# Patient Record
Sex: FEMALE | URBAN - METROPOLITAN AREA
[De-identification: names, ages, dates, MRNs, and addresses within clinical notes are randomized per-mention and may not be internally consistent; named-entity substitution may affect disease eponyms.]

---

## 2023-08-16 ENCOUNTER — TRANSCRIBE ORDERS (OUTPATIENT)
Dept: PHYSICAL THERAPY | Facility: CLINIC | Age: 83
End: 2023-08-16

## 2023-08-16 DIAGNOSIS — R42 DIZZINESS AND GIDDINESS: Primary | ICD-10-CM

## 2023-09-12 ENCOUNTER — TREATMENT (OUTPATIENT)
Dept: PHYSICAL THERAPY | Facility: CLINIC | Age: 83
End: 2023-09-12
Payer: MEDICARE

## 2023-09-12 DIAGNOSIS — R26.89 IMPAIRMENT OF BALANCE: Primary | ICD-10-CM

## 2023-09-12 NOTE — PROGRESS NOTES
Physical Therapy Initial Evaluation and Plan of Care     [facility] [place]          [address] [place]     Patient: Lucinda Taveras   : 1940  Diagnosis/ICD-10 Code:  Impairment of balance [R26.89]  Referring practitioner: Davina Dennis MD  Date of Initial Visit: 2023  Today's Date: 2023  Patient seen for 1 sessions    Subjective:     Subjective Questionnaire: BAUTISTA 38/56  TUG 16.67 sec  10 Meter 11.61 sec      Subjective Evaluation    History of Present Illness  Mechanism of injury: Patients balance has been off for a little bit. Since covid they have seen a decline in mobility. She has had her 2 nd shot of cortison for her knees. She had a fall in [employer] parking lot a few months ago. Strength is declined. The fall was sidways to the L. When she showers she has to be holding and touching something. Driving herself. Lives alone, no stairs. Daughter lives across the [facility]. Lawn care is hired. Getting up from chairs are hard. Bending over and squatting is challenging.       Patient Occupation: retired Pain  Current pain ratin  At best pain ratin  At worst pain ratin  Location: B knees    Social Support  Lives in: one-story house  Lives with: alone           Objective          Strength/Myotome Testing     Left Hip   Planes of Motion   Flexion: 4  Extension: 2  Abduction: 4    Right Hip   Planes of Motion   Flexion: 4  Extension: 2  Abduction: 4    Left Knee   Flexion: 4+  Extension: 4+    Right Knee   Flexion: 4+  Extension: 4+    Ambulation     Comments   Normalized gait with slow speed      PT Neuro         Assessment & Plan       Assessment  Impairments: abnormal coordination, abnormal gait, activity intolerance, impaired balance, impaired physical strength and safety issue   Functional limitations: carrying objects, walking, standing and stooping   Assessment details: Patient is an 82 YOF that presents with ongoing balance and strength deficits. MMT  reveals decreased strength throughout B hip musculature. Patient also is testing low on BAUTISTA balance test with a 38/56, which is a moderate risk for falls. She will require skilled PT intervention to address deficits in order to improve global mobility and function.   Prognosis: fair    Goals  Plan Goals: STG (4 visits)  1. Patient will report compliance with initial HEP.   2. Patient to improve BAUTISTA balance score to >/= 42/56 to decrease patient's risk of falls.  3. Patient to perform TUG within 14 sec without LOB for improved functional mobility.  4. Patient to ambulate 10 meters without AD within 10 sec without LOB for improved gait caitlyn and functional mobility.      LTG (8 visits)  1. Patient will be I with final HEP.   2. Patient to improve BAUTISTA balance score to >/= 46/56 to decrease patient's risk of falls.  3. Patient to perform TUG within 12 sec without LOB for improved functional mobility.  4. Patient to ambulate 10 meters without AD within 9 sec without LOB for improved gait caitlyn and functional mobility.        Plan  Therapy options: will be seen for skilled therapy services  Planned modality interventions: TENS  Planned therapy interventions: ADL retraining, balance/weight-bearing training, flexibility, gait training, manual therapy, neuromuscular re-education, motor coordination training, postural training, strengthening, stretching, therapeutic activities, transfer training and home exercise program  Duration in visits: 8  Treatment plan discussed with: patient and family  Plan details: Patient will be seen 1x/wk x 8wks with treatment to include strengthening, stretching, manual therapy, neuromuscular re-education, balance, gait and endurance training.         Timed:  Manual Therapy:    0     mins  66888;  Therapeutic Exercise:    10     mins  27548;     Neuromuscular Yesi:    0    mins  85341;    Therapeutic Activity:     0     mins  56951;     Gait Trainin     mins  16487;      Electrical Stimulation:    0     mins  13592 ( );    Untimed:  Canalith Repositioning    0     mins 77626    Timed Treatment:   10   mins   Total Treatment:     45   mins    PT SIGNATURE: Jacinda Ward, PT, DPT, MSCS, CDP, CSRS  [place] License #[id number]  DATE TREATMENT INITIATED: 9/12/2023      Medicare Initial Certification Certification Period: 9/12/20230024zmka50/10/2023  I certify that the therapy services are furnished while this patient is under my care.  The services outlined above are required by this patient, and will be reviewed every 90 days.     PHYSICIAN: Davina Dennis MD  NPI: [id number]                                         DATE:     Please sign and return via fax to [phone].   Thank you,   [facility].

## 2023-09-26 ENCOUNTER — TREATMENT (OUTPATIENT)
Dept: PHYSICAL THERAPY | Facility: CLINIC | Age: 83
End: 2023-09-26
Payer: MEDICARE

## 2023-09-26 DIAGNOSIS — R26.89 IMPAIRMENT OF BALANCE: Primary | ICD-10-CM

## 2023-09-26 NOTE — PROGRESS NOTES
Physical Therapy Daily Note  Visit: 2  Date of Initial Visit: Type: THERAPY  Noted: 2023    Patient: Lucinda Taveras   : 1940  Diagnosis/ICD-10 Code:  Impairment of balance [R26.89]  Referring practitioner: Davina Dennis MD  Date of Initial Visit: Type: THERAPY  Noted: 2023  Today's Date: 2023  Patient seen for 2 sessions    Subjective:   Patient reports: she is feeling pretty good.   Pain: 0/10  Clinical Progress: improved  Home Program Compliance: Yes  Treatment has included: therapeutic exercise and neuromuscular re-education    Objective   See Exercise, Manual, and Modality Logs for complete treatment.    PT Neuro          Assessment & Plan       Assessment  Assessment details: Patient is performing all exercises well. Her balance is improving by shifting weight forward on her toes. Her strength is functional increasing with improved sit to stands without the use of UE's. Patient will continue to be progressed as indicated for balance and strength.     Plan  Plan details: Patient to continue with PT services to improve gait, balance, strength, transfers and overall functional mobility.        Timed:  Manual Therapy:            0     mins  46448;  Therapeutic Exercise:    25    mins  29531;     Neuromuscular Yesi:    15    mins  36484;    Therapeutic Activity:      0     mins  34846;     Gait Trainin    mins  35838;     Electrical Stimulation:    0    mins  69833 ( );     Untimed:  Canalith Repositioning techniques _0_ [id number]      Timed Treatment:   40   mins   Total Treatment:     40   mins      Jacinda Ward, PT, DPT, MSCS, CDP, CSRS  [place] License #: [id number]  Physical Therapist

## 2023-11-04 ENCOUNTER — HOSPITAL ENCOUNTER (EMERGENCY)
Facility: HOSPITAL | Age: 83
Discharge: HOME | End: 2023-11-04
Attending: EMERGENCY MEDICINE
Payer: MEDICARE

## 2023-11-04 ENCOUNTER — APPOINTMENT (OUTPATIENT)
Dept: CARDIOLOGY | Facility: HOSPITAL | Age: 83
End: 2023-11-04
Payer: MEDICARE

## 2023-11-04 VITALS
BODY MASS INDEX: 29.73 KG/M2 | SYSTOLIC BLOOD PRESSURE: 144 MMHG | OXYGEN SATURATION: 97 % | DIASTOLIC BLOOD PRESSURE: 104 MMHG | TEMPERATURE: 98.2 F | WEIGHT: 185 LBS | RESPIRATION RATE: 18 BRPM | HEIGHT: 66 IN | HEART RATE: 89 BPM

## 2023-11-04 DIAGNOSIS — M54.16 LUMBAR RADICULAR PAIN: Primary | ICD-10-CM

## 2023-11-04 DIAGNOSIS — I70.422 ATHEROSCLEROSIS OF AUTOLOGOUS VEIN BYPASS GRAFT OF LEFT LOWER EXTREMITY WITH REST PAIN: ICD-10-CM

## 2023-11-04 LAB
ALBUMIN SERPL-MCNC: 4.3 G/DL (ref 3.5–5.2)
ALBUMIN/GLOB SERPL: 2 G/DL
ALP SERPL-CCNC: 69 U/L (ref 39–117)
ALT SERPL W P-5'-P-CCNC: 30 U/L (ref 1–33)
ANION GAP SERPL CALCULATED.3IONS-SCNC: 11 MMOL/L (ref 5–15)
AST SERPL-CCNC: 37 U/L (ref 1–32)
BASOPHILS # BLD AUTO: 0.08 10*3/MM3 (ref 0–0.2)
BASOPHILS NFR BLD AUTO: 1.2 % (ref 0–1.5)
BH CV LOWER VASCULAR LEFT COMMON FEMORAL AUGMENT: NORMAL
BH CV LOWER VASCULAR LEFT COMMON FEMORAL COMPRESS: NORMAL
BH CV LOWER VASCULAR LEFT COMMON FEMORAL PHASIC: NORMAL
BH CV LOWER VASCULAR LEFT COMMON FEMORAL SPONT: NORMAL
BH CV LOWER VASCULAR LEFT DISTAL FEMORAL COMPRESS: NORMAL
BH CV LOWER VASCULAR LEFT GASTRONEMIUS COMPRESS: NORMAL
BH CV LOWER VASCULAR LEFT GREATER SAPH AK COMPRESS: NORMAL
BH CV LOWER VASCULAR LEFT GREATER SAPH BK COMPRESS: NORMAL
BH CV LOWER VASCULAR LEFT LESSER SAPH COMPRESS: NORMAL
BH CV LOWER VASCULAR LEFT MID FEMORAL AUGMENT: NORMAL
BH CV LOWER VASCULAR LEFT MID FEMORAL COMPRESS: NORMAL
BH CV LOWER VASCULAR LEFT MID FEMORAL PHASIC: NORMAL
BH CV LOWER VASCULAR LEFT MID FEMORAL SPONT: NORMAL
BH CV LOWER VASCULAR LEFT PERONEAL COMPRESS: NORMAL
BH CV LOWER VASCULAR LEFT POPLITEAL AUGMENT: NORMAL
BH CV LOWER VASCULAR LEFT POPLITEAL COMPRESS: NORMAL
BH CV LOWER VASCULAR LEFT POPLITEAL PHASIC: NORMAL
BH CV LOWER VASCULAR LEFT POPLITEAL SPONT: NORMAL
BH CV LOWER VASCULAR LEFT POSTERIOR TIBIAL COMPRESS: NORMAL
BH CV LOWER VASCULAR LEFT PROFUNDA FEMORAL PHASIC: NORMAL
BH CV LOWER VASCULAR LEFT PROFUNDA FEMORAL SPONT: NORMAL
BH CV LOWER VASCULAR LEFT PROXIMAL FEMORAL COMPRESS: NORMAL
BH CV LOWER VASCULAR LEFT SAPHENOFEMORAL JUNCTION COMPRESS: NORMAL
BH CV LOWER VASCULAR LEFT VARICOSITY AK COMPRESS: NORMAL
BH CV LOWER VASCULAR LEFT VARICOSITY BK COMPRESS: NORMAL
BH CV LOWER VASCULAR RIGHT COMMON FEMORAL AUGMENT: NORMAL
BH CV LOWER VASCULAR RIGHT COMMON FEMORAL COMPRESS: NORMAL
BH CV LOWER VASCULAR RIGHT COMMON FEMORAL PHASIC: NORMAL
BH CV LOWER VASCULAR RIGHT COMMON FEMORAL SPONT: NORMAL
BH CV VAS PRELIMINARY FINDINGS SCRIPTING: 1
BILIRUB SERPL-MCNC: 0.3 MG/DL (ref 0–1.2)
BUN SERPL-MCNC: 17 MG/DL (ref 8–23)
BUN/CREAT SERPL: 17.3 (ref 7–25)
CALCIUM SPEC-SCNC: 9.6 MG/DL (ref 8.6–10.5)
CHLORIDE SERPL-SCNC: 105 MMOL/L (ref 98–107)
CO2 SERPL-SCNC: 25 MMOL/L (ref 22–29)
CREAT SERPL-MCNC: 0.98 MG/DL (ref 0.57–1)
DEPRECATED RDW RBC AUTO: 45.4 FL (ref 37–54)
EGFRCR SERPLBLD CKD-EPI 2021: 57.4 ML/MIN/1.73
EOSINOPHIL # BLD AUTO: 1.22 10*3/MM3 (ref 0–0.4)
EOSINOPHIL NFR BLD AUTO: 17.8 % (ref 0.3–6.2)
ERYTHROCYTE [DISTWIDTH] IN BLOOD BY AUTOMATED COUNT: 13.9 % (ref 12.3–15.4)
GLOBULIN UR ELPH-MCNC: 2.2 GM/DL
GLUCOSE SERPL-MCNC: 187 MG/DL (ref 65–99)
HCT VFR BLD AUTO: 38.3 % (ref 34–46.6)
HGB BLD-MCNC: 12.2 G/DL (ref 12–15.9)
HOLD SPECIMEN: NORMAL
IMM GRANULOCYTES # BLD AUTO: 0.02 10*3/MM3 (ref 0–0.05)
IMM GRANULOCYTES NFR BLD AUTO: 0.3 % (ref 0–0.5)
LYMPHOCYTES # BLD AUTO: 1.5 10*3/MM3 (ref 0.7–3.1)
LYMPHOCYTES NFR BLD AUTO: 21.8 % (ref 19.6–45.3)
MCH RBC QN AUTO: 28.4 PG (ref 26.6–33)
MCHC RBC AUTO-ENTMCNC: 31.9 G/DL (ref 31.5–35.7)
MCV RBC AUTO: 89.3 FL (ref 79–97)
MONOCYTES # BLD AUTO: 0.59 10*3/MM3 (ref 0.1–0.9)
MONOCYTES NFR BLD AUTO: 8.6 % (ref 5–12)
NEUTROPHILS NFR BLD AUTO: 3.46 10*3/MM3 (ref 1.7–7)
NEUTROPHILS NFR BLD AUTO: 50.3 % (ref 42.7–76)
NRBC BLD AUTO-RTO: 0 /100 WBC (ref 0–0.2)
PLATELET # BLD AUTO: 190 10*3/MM3 (ref 140–450)
PMV BLD AUTO: 9.5 FL (ref 6–12)
POTASSIUM SERPL-SCNC: 4.4 MMOL/L (ref 3.5–5.2)
PROT SERPL-MCNC: 6.5 G/DL (ref 6–8.5)
RBC # BLD AUTO: 4.29 10*6/MM3 (ref 3.77–5.28)
SODIUM SERPL-SCNC: 141 MMOL/L (ref 136–145)
WBC NRBC COR # BLD: 6.87 10*3/MM3 (ref 3.4–10.8)
WHOLE BLOOD HOLD COAG: NORMAL
WHOLE BLOOD HOLD SPECIMEN: NORMAL

## 2023-11-04 PROCEDURE — 80053 COMPREHEN METABOLIC PANEL: CPT | Performed by: EMERGENCY MEDICINE

## 2023-11-04 PROCEDURE — 85025 COMPLETE CBC W/AUTO DIFF WBC: CPT | Performed by: EMERGENCY MEDICINE

## 2023-11-04 PROCEDURE — 99284 EMERGENCY DEPT VISIT MOD MDM: CPT

## 2023-11-04 PROCEDURE — 93971 EXTREMITY STUDY: CPT | Performed by: INTERNAL MEDICINE

## 2023-11-04 PROCEDURE — 93971 EXTREMITY STUDY: CPT

## 2023-11-10 NOTE — ED PROVIDER NOTES
Subjective   History of Present Illness  82-year-old female that has a history of arthritis in her knees states that both knees been hurting but her left lower extremities been having some pain and aching she says no redness no induration no trauma.  The pain is there most of the time now seems a little worse with straightening the leg does not necessarily get worse with ambulation.  No numbness or tingling no other complaints.  She is concerned she has a blood clot, get a Doppler.  No foot pain.  No discoloration of the lower extremities.    History provided by:  Patient   used: No    Leg Pain  Location:  Leg  Time since incident:  2 weeks  Injury: no    Leg location:  L lower leg  Pain details:     Quality:  Dull    Radiates to:  Does not radiate    Severity:  Mild    Onset quality:  Gradual    Timing:  Constant    Progression:  Unchanged  Chronicity:  New  Dislocation: no    Foreign body present:  No foreign bodies  Tetanus status:  Unknown  Prior injury to area:  No  Relieved by:  Nothing  Exacerbated by: Palpation.  Ineffective treatments:  None tried  Associated symptoms: no back pain, no decreased ROM, no fever, no itching, no numbness, no stiffness, no swelling and no tingling        Review of Systems   Constitutional:  Negative for chills and fever.   Respiratory:  Negative for chest tightness, shortness of breath and wheezing.    Cardiovascular:  Negative for chest pain and palpitations.   Genitourinary:  Negative for dysuria, frequency and urgency.   Musculoskeletal:  Negative for back pain and stiffness.   Skin:  Negative for itching.       No past medical history on file.    No Known Allergies    No past surgical history on file.    No family history on file.    Social History     Socioeconomic History    Marital status:            Objective   Physical Exam  Vitals and nursing note reviewed.   Constitutional:       General: She is not in acute distress.     Appearance: She is  "well-developed. She is not diaphoretic.   HENT:      Head: Normocephalic and atraumatic.      Nose: Nose normal.   Eyes:      General: No scleral icterus.     Conjunctiva/sclera: Conjunctivae normal.   Cardiovascular:      Rate and Rhythm: Normal rate and regular rhythm.      Heart sounds: Normal heart sounds. No murmur heard.  Pulmonary:      Effort: Pulmonary effort is normal. No respiratory distress.      Breath sounds: Normal breath sounds.   Abdominal:      General: Bowel sounds are normal.      Palpations: Abdomen is soft.      Tenderness: There is no abdominal tenderness.   Musculoskeletal:         General: Normal range of motion.      Cervical back: Normal range of motion and neck supple.      Comments: Lower extremities both have multiple varicosities.  There is no redness no induration.  Skin is warm pink dry distal pulses faintly palpable on the dorsalis pedis.   Skin:     General: Skin is warm and dry.   Neurological:      Mental Status: She is alert and oriented to person, place, and time.   Psychiatric:         Behavior: Behavior normal.         Procedures           ED Course                No results found for this or any previous visit (from the past 24 hour(s)).  Note: In addition to lab results from this visit, the labs listed above may include labs taken at another facility or during a different encounter within the last 24 hours. Please correlate lab times with ED admission and discharge times for further clarification of the services performed during this visit.    No orders to display     Vitals:    11/04/23 1243   BP: (!) 144/104   BP Location: Left arm   Patient Position: Sitting   Pulse: 89   Resp: 18   Temp: 98.2 °F (36.8 °C)   TempSrc: Oral   SpO2: 97%   Weight: 83.9 kg (185 lb)   Height: 167.6 cm (66\")     Medications - No data to display  ECG/EMG Results (last 24 hours)       ** No results found for the last 24 hours. 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    Risk  Prescription drug management.        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               Where to Get Your Medications        These medications were sent to [facility] [id number] - [facility] [address] [place] [address] AT [facility] - [phone]  - [phone]   [address] [facility] [address]      Phone: [phone]   diclofenac 50 MG EC tablet            Robe Chapman PA  11/09/23 7823